# Patient Record
Sex: FEMALE | Race: WHITE | NOT HISPANIC OR LATINO | Employment: OTHER | URBAN - METROPOLITAN AREA
[De-identification: names, ages, dates, MRNs, and addresses within clinical notes are randomized per-mention and may not be internally consistent; named-entity substitution may affect disease eponyms.]

---

## 2018-08-14 ENCOUNTER — TELEPHONE (OUTPATIENT)
Dept: NEUROSURGERY | Facility: CLINIC | Age: 73
End: 2018-08-14

## 2018-08-14 DIAGNOSIS — S32.010A CLOSED COMPRESSION FRACTURE OF THORACOLUMBAR VERTEBRA, INITIAL ENCOUNTER: Primary | ICD-10-CM

## 2018-08-14 DIAGNOSIS — S22.080A CLOSED COMPRESSION FRACTURE OF THORACOLUMBAR VERTEBRA, INITIAL ENCOUNTER: Primary | ICD-10-CM

## 2018-08-14 PROBLEM — S92.001A CLOSED NONDISPLACED FRACTURE OF RIGHT CALCANEUS: Status: ACTIVE | Noted: 2018-08-14

## 2018-08-14 NOTE — TELEPHONE ENCOUNTER
Appts scheduled. Pt is currently IP.    ----- Message from Breta Ratliff NP sent at 8/14/2018 10:35 AM CDT -----  Consult I saw today. She will be discharged today.     Needs follow-up in 4 weeks with standing/upright thoracolumbar or thoracic and lumbar x-rays ap/lat. She can be scheduled with [facility].     Thanks!